# Patient Record
Sex: MALE
[De-identification: names, ages, dates, MRNs, and addresses within clinical notes are randomized per-mention and may not be internally consistent; named-entity substitution may affect disease eponyms.]

---

## 2018-09-21 ENCOUNTER — HOSPITAL (OUTPATIENT)
Dept: OTHER | Age: 83
End: 2018-09-21
Attending: INTERNAL MEDICINE

## 2019-07-02 PROBLEM — R19.7 DIARRHEA, UNSPECIFIED TYPE: Status: ACTIVE | Noted: 2018-06-19

## 2019-07-02 PROBLEM — R00.2 PALPITATIONS: Status: ACTIVE | Noted: 2017-12-18

## 2019-07-02 PROBLEM — F41.8 OTHER SPECIFIED ANXIETY DISORDERS: Status: ACTIVE | Noted: 2017-09-11

## 2019-07-02 PROBLEM — J18.9 PNEUMONIA OF LEFT LOWER LOBE DUE TO INFECTIOUS ORGANISM: Status: ACTIVE | Noted: 2018-01-05

## 2019-07-02 PROBLEM — R06.02 SHORTNESS OF BREATH: Status: ACTIVE | Noted: 2017-09-11

## 2019-07-02 PROBLEM — I49.5 SSS (SICK SINUS SYNDROME) (HCC): Status: ACTIVE | Noted: 2017-09-21

## 2019-07-03 PROBLEM — Z86.69 HISTORY OF SEIZURE DISORDER: Status: ACTIVE | Noted: 2019-07-03

## 2019-09-06 PROBLEM — M25.511 CHRONIC RIGHT SHOULDER PAIN: Status: ACTIVE | Noted: 2019-09-06

## 2019-09-06 PROBLEM — G89.29 CHRONIC RIGHT SHOULDER PAIN: Status: ACTIVE | Noted: 2019-09-06

## 2019-09-16 PROBLEM — I73.9 PVD (PERIPHERAL VASCULAR DISEASE) (HCC): Status: ACTIVE | Noted: 2019-09-16

## 2019-09-16 PROBLEM — E11.42 DIABETIC PERIPHERAL NEUROPATHY (HCC): Status: ACTIVE | Noted: 2019-09-16

## 2019-09-16 PROBLEM — M25.511 ACUTE PAIN OF RIGHT SHOULDER: Status: ACTIVE | Noted: 2019-09-06

## 2020-05-19 PROBLEM — R35.1 FREQUENT URINATION AT NIGHT: Status: ACTIVE | Noted: 2020-05-19

## 2020-05-19 PROBLEM — S32.82XD: Status: ACTIVE | Noted: 2020-05-19

## 2020-05-19 PROBLEM — R63.4 WEIGHT LOSS: Status: ACTIVE | Noted: 2020-05-19

## 2020-06-05 PROBLEM — R20.2 PARESTHESIA OF BILATERAL LEGS: Status: ACTIVE | Noted: 2020-06-05

## 2020-08-05 ENCOUNTER — OFFICE VISIT (OUTPATIENT)
Dept: AUDIOLOGY | Facility: CLINIC | Age: 85
End: 2020-08-05
Payer: COMMERCIAL

## 2020-08-05 ENCOUNTER — OFFICE VISIT (OUTPATIENT)
Dept: OTOLARYNGOLOGY | Facility: CLINIC | Age: 85
End: 2020-08-05
Payer: COMMERCIAL

## 2020-08-05 VITALS
WEIGHT: 135 LBS | BODY MASS INDEX: 21.19 KG/M2 | DIASTOLIC BLOOD PRESSURE: 81 MMHG | SYSTOLIC BLOOD PRESSURE: 135 MMHG | TEMPERATURE: 97 F | HEIGHT: 67 IN

## 2020-08-05 DIAGNOSIS — H91.10 PRESBYCUSIS, UNSPECIFIED LATERALITY: ICD-10-CM

## 2020-08-05 DIAGNOSIS — H61.23 BILATERAL IMPACTED CERUMEN: Primary | ICD-10-CM

## 2020-08-05 DIAGNOSIS — H90.3 SENSORINEURAL HEARING LOSS, BILATERAL: Primary | ICD-10-CM

## 2020-08-05 PROCEDURE — 3008F BODY MASS INDEX DOCD: CPT | Performed by: OTOLARYNGOLOGY

## 2020-08-05 PROCEDURE — 3079F DIAST BP 80-89 MM HG: CPT | Performed by: OTOLARYNGOLOGY

## 2020-08-05 PROCEDURE — 99203 OFFICE O/P NEW LOW 30 MIN: CPT | Performed by: OTOLARYNGOLOGY

## 2020-08-05 PROCEDURE — 92567 TYMPANOMETRY: CPT | Performed by: AUDIOLOGIST

## 2020-08-05 PROCEDURE — 92557 COMPREHENSIVE HEARING TEST: CPT | Performed by: AUDIOLOGIST

## 2020-08-05 PROCEDURE — G0463 HOSPITAL OUTPT CLINIC VISIT: HCPCS | Performed by: OTOLARYNGOLOGY

## 2020-08-05 PROCEDURE — 3075F SYST BP GE 130 - 139MM HG: CPT | Performed by: OTOLARYNGOLOGY

## 2020-08-05 NOTE — PROGRESS NOTES
Rianna Schreiber is a 80year old male. Patient presents with:  Ear Problem: pt presents with Bilateral ear wax Impaction         HISTORY OF PRESENT ILLNESS  8/5/2020   Here for evaluation of   hearing loss.  Patient feels this has worsened over the las months History   Problem Relation Age of Onset   • No Known Problems Sister    • No Known Problems Sister    • No Known Problems Sister    • No Known Problems Sister      Past Medical History:   Diagnosis Date   • Abnormal liver function test    • Allergic rhinit Neuro Negative Tremors. Psych Negative Behavioral issues   Integumentary Negative Frequent skin infections, pigment change and rash. Hema/Lymph Negative Easy bleeding and easy bruising.      PHYSICAL EXAM    /81 (BP Location: Left arm, Patient P 270 capsule, Rfl: 1  •  Clopidogrel Bisulfate 75 MG Oral Tab, Take 1 tablet (75 mg total) by mouth once daily. , Disp: 90 tablet, Rfl: 1  •  Multiple Vitamins-Minerals (PRESERVISION AREDS) Oral Cap, Take 2 capsules by mouth daily. , Disp: 30 capsule, Rfl: 0

## 2020-08-05 NOTE — PROGRESS NOTES
AUDIOLOGY REPORT      Jimmy Pastrana is a 80year old male     Referring Provider: No ref. provider found   YOB: 1935  Medical Record: [id number]      Patient Hearing History:  Patient is here with wife today.    She reported their adult childr he is noticing any improvement since the ear cleaning. May return for further hearing aid consultation at any time if desired. Recommendations: Follow up with Dr. Lisa Saavedra as needed.      Annual audiograms to monitor hearing or sooner if any bowen

## 2021-04-06 PROBLEM — R25.2 LEG CRAMP: Status: ACTIVE | Noted: 2021-04-06

## 2024-05-21 ENCOUNTER — HOSPITAL ENCOUNTER (OUTPATIENT)
Dept: ULTRASOUND IMAGING | Age: 89
Discharge: HOME | End: 2024-05-21
Attending: UROLOGY

## 2024-05-21 DIAGNOSIS — N13.9 URINARY OBSTRUCTION: ICD-10-CM

## 2024-05-21 PROCEDURE — 76770 US EXAM ABDO BACK WALL COMP: CPT | Performed by: UROLOGY

## 2024-05-28 ENCOUNTER — OFFICE VISIT (OUTPATIENT)
Dept: NEUROLOGY | Facility: CLINIC | Age: 89
End: 2024-05-28

## 2024-05-28 VITALS — BODY MASS INDEX: 20.33 KG/M2 | HEIGHT: 65 IN | WEIGHT: 122 LBS

## 2024-05-28 DIAGNOSIS — F02.80 ALZHEIMER DISEASE (HCC): ICD-10-CM

## 2024-05-28 DIAGNOSIS — G40.309 GENERALIZED EPILEPSY (HCC): Primary | ICD-10-CM

## 2024-05-28 DIAGNOSIS — G30.9 ALZHEIMER DISEASE (HCC): ICD-10-CM

## 2024-05-28 PROCEDURE — 3008F BODY MASS INDEX DOCD: CPT | Performed by: OTHER

## 2024-05-28 PROCEDURE — 1159F MED LIST DOCD IN RCRD: CPT | Performed by: OTHER

## 2024-05-28 PROCEDURE — 1160F RVW MEDS BY RX/DR IN RCRD: CPT | Performed by: OTHER

## 2024-05-28 PROCEDURE — 99204 OFFICE O/P NEW MOD 45 MIN: CPT | Performed by: OTHER

## 2024-05-28 RX ORDER — TORSEMIDE 10 MG/1
10 TABLET ORAL DAILY
COMMUNITY
Start: 2024-05-08

## 2024-05-28 RX ORDER — ACETAMINOPHEN AND CODEINE PHOSPHATE 300; 30 MG/1; MG/1
1-2 TABLET ORAL EVERY 12 HOURS PRN
COMMUNITY
Start: 2024-04-05

## 2024-05-28 RX ORDER — FINASTERIDE 5 MG/1
5 TABLET, FILM COATED ORAL DAILY
COMMUNITY
Start: 2024-02-02

## 2024-05-28 RX ORDER — LEVETIRACETAM 100 MG/ML
SOLUTION ORAL
COMMUNITY
Start: 2024-03-27 | End: 2024-05-28

## 2024-05-28 RX ORDER — DIGOXIN 125 MCG
125 TABLET ORAL DAILY
COMMUNITY
Start: 2024-05-08

## 2024-05-28 RX ORDER — LEVETIRACETAM 100 MG/ML
SOLUTION ORAL
Qty: 473 ML | Refills: 3 | Status: SHIPPED | OUTPATIENT
Start: 2024-05-28

## 2024-05-28 NOTE — PROGRESS NOTES
[facility] [address], [address]  [facility] [address]  [phone]              Date: May 28, 2024  Patient Name: Live Baldwin   MRN: [id number]    Reason for Evaluation: Seizures, memory     HPI:     Live Baldwin is a 88 year old man with past medical history of generalized epilepsy, Alzheimer's disease, heart failure with reduced EF, coronary artery disease, sick sinus syndrome post pacemaker placement, peripheral vascular disease, type 2 diabetes and paroxysmal atrial fibrillation who presents to establish care regarding his generalized epilepsy and Alzheimer's disease.    He moved from [place].  He established with a new primary care physician.  It was noted in the documentation that his wife and he preferred him not to see a neurologist about his dementia.    The last neurology note was in March 2024.  That time he has been Keppra 2500 mg twice daily and Dilantin.    Epilepsy history: 3 seizures in the hospital (\"absence\" or \"silent seizures\") when his Dilantin was being adjusted.  First generalized tonic-clonic seizure (nocturnal) at age 62.  Then seizure-free for 12 years.  Had breakthrough seizures while taking generic Phenytoin.  A neurologist recommended to change Dilantin to Keppra.  He is now stable on Keppra.  Off Dilantin completely.  No mood changes on Keppra.      His dementia does lead to irritability.      Carries out some ADLs: dresses self, empties , makes bed.      History is primarily from wife and EMR.      OUTPATIENT MEDICATIONS  Current Outpatient Medications on File Prior to Visit   Medication Sig Dispense Refill    torsemide 10 MG Oral Tab Take 1 tablet (10 mg total) by mouth daily.      digoxin 0.125 MG Oral Tab Take 1 tablet (125 mcg total) by mouth daily.      apixaban 2.5 MG Oral Tab Take 1 tablet (2.5 mg total) by mouth 2 (two) times daily.      finasteride 5 MG Oral Tab Take 1 tablet (5 mg total) by mouth daily.      levETIRAcetam 100  MG/ML Oral Solution TAKE 2.5 ML BY G TUBE 2 TIMES DAILY  DAYS.      acetaminophen 325 MG Rectal Suppos Place 1 suppository (325 mg total) rectally every 4 (four) hours as needed for Fever.      ATORVASTATIN 10 MG Oral Tab TAKE 1 TABLET BY MOUTH EVERY DAY 90 tablet 0    Multiple Vitamins-Minerals (PRESERVISION AREDS) Oral Cap Take 2 capsules by mouth daily. 30 capsule 0    acetaminophen-codeine 300-30 MG Oral Tab Take 1-2 tablets by mouth every 12 (twelve) hours as needed. (Patient not taking: Reported on 5/28/2024)      ATENOLOL 50 MG Oral Tab TAKE 1 TABLET BY MOUTH EVERY DAY 90 tablet 0    tamsulosin (FLOMAX) cap TAKE 1 CAPSULE BY MOUTH EVERY DAY 90 capsule 0    LISINOPRIL 10 MG Oral Tab TAKE 1 TABLET BY MOUTH EVERY DAY 90 tablet 0    rivastigmine 9.5 MG/24HR Transdermal Patch 24 Hr Place 1 patch onto the skin daily.      FENOFIBRATE 145 MG Oral Tab TAKE 1 TABLET (145 MG TOTAL) BY MOUTH ONCE DAILY. 90 tablet 1     No current facility-administered medications on file prior to visit.       MEDICAL HISTORY  Past Medical History:    Abnormal liver function test    Allergic rhinitis due to allergen    Anemia    BPH without urinary obstruction    CAD (coronary artery disease)    Carotid bruit    Central stenosis of spinal canal    l2-3 disc protrusion    DDD (degenerative disc disease), cervical    Diarrhea, unspecified type    Encounter for long-term (current) drug use    Enlarged prostate with lower urinary tract symptoms (LUTS)    Esophageal ulcer without bleeding    Essential hypertension    Hyperlipidemia    Impaired fasting glucose    Lower extremity edema    Memory loss    Osteoarthritis    Other intervertebral disc degeneration, lumbar region    Other specified anxiety disorders    Other specified symptoms and signs involving the circulatory and respiratory systems    Paresthesias/numbness    Primary generalized (osteo)arthritis    Spondylolisthesis at L5-S1 level    SSS (sick sinus syndrome) (HCC)     Vitamin D deficiency       SURGICAL HISTORY  Past Surgical History:   Procedure Laterality Date    Appendectomy      Cabg      Cataract Bilateral     Cholecystectomy      Hip replacement surgery Bilateral     Other surgical history Right 2008    hip arthroplasty    Other surgical history Left 2010    hip arthroplasty     Other surgical history      PTCA    Other surgical history      pacemaker insertion    Tonsillectomy         SOCIAL HISTORY  Social History     Socioeconomic History    Marital status:    Tobacco Use    Smoking status: Former    Smokeless tobacco: Never    Tobacco comments:     50   Vaping Use    Vaping status: Never Used   Substance and Sexual Activity    Alcohol use: Never    Drug use: Never       FAMILY HISTORY  Family History   Problem Relation Age of Onset    No Known Problems Sister     No Known Problems Sister     No Known Problems Sister     No Known Problems Sister        ALLERGIES  Allergies   Allergen Reactions    Aspirin ANAPHYLAXIS, SWELLING, SHORTNESS OF BREATH and UNKNOWN       REVIEW OF SYSTEMS:   13-point review of systems was done and is negative unless otherwise stated in HPI.     PHYSICAL EXAM:     Ht 65\"   Wt 122 lb (55.3 kg)   BMI 20.30 kg/m²   General appearance: Well appearing, alert and in no acute distress  Skin: skin color normal.  No rashes or lesions.    Head: Normocephalic, atraumatic.    Neurological:  Mental Status: Alert, reduced fund of knowledge, Follows commands, and Speech fluent and appropriate.  Cranial Nerves: visual fields intact to confrontation, extraocular movements intact, facial sensation intact, face symmetric, no facial droop or ptosis, normal bedside auditory acuity, no dysarthria  Motor: muscle strength 5/5 both upper and lower extremities  Reflexes: UE and LE reflexes are equal and reactive  Sensation: intact to light touch  Coordination: Finger-to- nose-finger intact bilaterally   Gait: normal-based.  Able to ambulate with and without  walker       LABS/DATA:  Lab work shows anemia, mild hypocalcemia, elevated alkaline phosphatase, LDL 33, hemoglobin A1c 5.5    In March 2024 his phenytoin was elevated     IMAGING:  N/A    ASSESSMENT:  The patient is a 88 year old past medical history of generalized epilepsy, Alzheimer's disease, heart failure with reduced EF, coronary artery disease, sick sinus syndrome post pacemaker placement, peripheral vascular disease, type 2 diabetes and paroxysmal atrial fibrillation who presents to establish care regarding his generalized epilepsy and Alzheimer's disease.     Generalized epilepsy   -Continue Keppra     Alzheimer disease   -Continue Rivastigmine patch  -Hold on Memantine - they prefer less medication   -Stop Trazodone - wife wanted less medication     Follow up: 4 months       Discussed indication, administration, dose, and side effects with patient of any medications personally prescribed. Patient was advised to let my office know if they have any questions or concerns.       Today, I personally spent 30 minutes in this case, including chart review, time spent with patient doing face to face evaluation w/ interview and exam and patient education, counseling, and time was spent in patient education, counseling, and coordination of care as described above.   Issues discussed: Diagnosis and implications on future health, benefits and side effects of present and future medications, test results as well as further testing and medications required.    This note was prepared using Dragon Medical voice recognition dictation software and as a result, errors may occur. When identified, these errors have been corrected. While every attempt is made to correct errors during dictation, discrepancies may still exist    GIORGI Lanza DO   Staff Neurologist   5/28/2024  2:28 PM

## 2024-09-12 ENCOUNTER — HOSPITAL ENCOUNTER (OUTPATIENT)
Dept: ULTRASOUND IMAGING | Age: 89
Discharge: HOME | End: 2024-09-12
Attending: UROLOGY
Payer: MEDICARE

## 2024-09-12 DIAGNOSIS — N39.0 UTI (URINARY TRACT INFECTION): ICD-10-CM

## 2024-09-12 DIAGNOSIS — N31.9 NEUROGENIC BLADDER: ICD-10-CM

## 2024-09-12 PROCEDURE — 76770 US EXAM ABDO BACK WALL COMP: CPT | Performed by: UROLOGY

## 2024-10-01 ENCOUNTER — LAB ENCOUNTER (OUTPATIENT)
Dept: LAB | Age: 89
End: 2024-10-01
Attending: UROLOGY
Payer: MEDICARE

## 2024-10-01 DIAGNOSIS — N31.0 UNINHIBITED NEUROPATHIC BLADDER, NOT ELSEWHERE CLASSIFIED: Primary | ICD-10-CM

## 2024-10-01 LAB
CREAT BLD-MCNC: 1.38 MG/DL
EGFRCR SERPLBLD CKD-EPI 2021: 49 ML/MIN/1.73M2 (ref 60–?)

## 2024-10-01 PROCEDURE — 82565 ASSAY OF CREATININE: CPT

## 2024-10-01 PROCEDURE — 36415 COLL VENOUS BLD VENIPUNCTURE: CPT
